# Patient Record
Sex: FEMALE | Race: WHITE | NOT HISPANIC OR LATINO | Employment: FULL TIME | URBAN - METROPOLITAN AREA
[De-identification: names, ages, dates, MRNs, and addresses within clinical notes are randomized per-mention and may not be internally consistent; named-entity substitution may affect disease eponyms.]

---

## 2017-02-01 ENCOUNTER — OFFICE VISIT (OUTPATIENT)
Dept: ORTHOPEDIC SURGERY | Facility: CLINIC | Age: 37
End: 2017-02-01

## 2017-02-01 VITALS — TEMPERATURE: 98.2 F | WEIGHT: 150 LBS | BODY MASS INDEX: 24.11 KG/M2 | HEIGHT: 66 IN

## 2017-02-01 DIAGNOSIS — M25.511 CHRONIC RIGHT SHOULDER PAIN: Primary | ICD-10-CM

## 2017-02-01 DIAGNOSIS — IMO0002 BURSITIS/TENDONITIS, SHOULDER: ICD-10-CM

## 2017-02-01 DIAGNOSIS — M77.11 RIGHT TENNIS ELBOW: ICD-10-CM

## 2017-02-01 DIAGNOSIS — G89.29 CHRONIC RIGHT SHOULDER PAIN: Primary | ICD-10-CM

## 2017-02-01 PROCEDURE — 99204 OFFICE O/P NEW MOD 45 MIN: CPT | Performed by: ORTHOPAEDIC SURGERY

## 2017-02-01 PROCEDURE — 73030 X-RAY EXAM OF SHOULDER: CPT | Performed by: ORTHOPAEDIC SURGERY

## 2017-02-01 PROCEDURE — 73010 X-RAY EXAM OF SHOULDER BLADE: CPT | Performed by: ORTHOPAEDIC SURGERY

## 2017-02-01 PROCEDURE — 20610 DRAIN/INJ JOINT/BURSA W/O US: CPT | Performed by: ORTHOPAEDIC SURGERY

## 2017-02-01 PROCEDURE — 20605 DRAIN/INJ JOINT/BURSA W/O US: CPT | Performed by: ORTHOPAEDIC SURGERY

## 2017-02-01 RX ADMIN — BUPIVACAINE HYDROCHLORIDE 1 ML: 5 INJECTION, SOLUTION PERINEURAL at 17:10

## 2017-02-01 RX ADMIN — METHYLPREDNISOLONE ACETATE 160 MG: 80 INJECTION, SUSPENSION INTRA-ARTICULAR; INTRALESIONAL; INTRAMUSCULAR; SOFT TISSUE at 17:09

## 2017-02-01 RX ADMIN — LIDOCAINE HYDROCHLORIDE 2 ML: 10 INJECTION, SOLUTION INFILTRATION; PERINEURAL at 17:09

## 2017-02-01 RX ADMIN — METHYLPREDNISOLONE ACETATE 80 MG: 80 INJECTION, SUSPENSION INTRA-ARTICULAR; INTRALESIONAL; INTRAMUSCULAR; SOFT TISSUE at 17:10

## 2017-02-01 RX ADMIN — BUPIVACAINE HYDROCHLORIDE 2 ML: 5 INJECTION, SOLUTION PERINEURAL at 17:09

## 2017-02-01 RX ADMIN — LIDOCAINE HYDROCHLORIDE 1 ML: 10 INJECTION, SOLUTION INFILTRATION; PERINEURAL at 17:10

## 2017-02-04 RX ORDER — LIDOCAINE HYDROCHLORIDE 10 MG/ML
1 INJECTION, SOLUTION INFILTRATION; PERINEURAL
Status: COMPLETED | OUTPATIENT
Start: 2017-02-01 | End: 2017-02-01

## 2017-02-04 RX ORDER — METHYLPREDNISOLONE ACETATE 80 MG/ML
80 INJECTION, SUSPENSION INTRA-ARTICULAR; INTRALESIONAL; INTRAMUSCULAR; SOFT TISSUE
Status: COMPLETED | OUTPATIENT
Start: 2017-02-01 | End: 2017-02-01

## 2017-02-04 RX ORDER — BUPIVACAINE HYDROCHLORIDE 5 MG/ML
2 INJECTION, SOLUTION PERINEURAL
Status: COMPLETED | OUTPATIENT
Start: 2017-02-01 | End: 2017-02-01

## 2017-02-04 RX ORDER — LIDOCAINE HYDROCHLORIDE 10 MG/ML
2 INJECTION, SOLUTION INFILTRATION; PERINEURAL
Status: COMPLETED | OUTPATIENT
Start: 2017-02-01 | End: 2017-02-01

## 2017-02-04 RX ORDER — METHYLPREDNISOLONE ACETATE 80 MG/ML
160 INJECTION, SUSPENSION INTRA-ARTICULAR; INTRALESIONAL; INTRAMUSCULAR; SOFT TISSUE
Status: COMPLETED | OUTPATIENT
Start: 2017-02-01 | End: 2017-02-01

## 2017-02-04 RX ORDER — BUPIVACAINE HYDROCHLORIDE 5 MG/ML
1 INJECTION, SOLUTION PERINEURAL
Status: COMPLETED | OUTPATIENT
Start: 2017-02-01 | End: 2017-02-01

## 2017-02-04 NOTE — PROGRESS NOTES
Patient: Lexus Johnson    YOB: 1980    Medical Record Number: [id number]    Chief Complaints:   Right shoulder and elbow pain    History of Present Illness:     36 y.o. female patient who presents with right shoulder pain and weakness.  She cannot recall any specific inciting event or factor.  Symptoms started about 9 months ago.  At the time, they were renovating their house and she feels that it may have been related to overuse.  She rested the shoulder and took ibuprofen which seemed to help significantly.  Unfortunately, she continues to have moderate intermittent aching and/or burning pain in the shoulder.  The pain is typically worse with reaching, lifting, and she specifically mentions that certain exercises and painting tend to aggravate her pain.  Localizes her pain primarily to the lateral aspect of the shoulder.    She has also been experiencing lateral sided elbow pain for the past several months.  She works as a  and she notices that certain movements at work tend to aggravate her pain.  Again, she cannot recall any specific inciting event or factor.    Allergies: No Known Allergies    Home Medications:    Current Outpatient Prescriptions:   •  naproxen (NAPROSYN) 375 MG tablet, Take 1 tablet by mouth 2 (two) times a day with meals., Disp: 60 tablet, Rfl: 3    Past Medical History   Diagnosis Date   • Allergic    • Scoliosis        Past Surgical History   Procedure Laterality Date   • Appendectomy     • [place] tooth extraction         Social History     Occupational History   •       Social History Main Topics   • Smoking status: Never Smoker   • Smokeless tobacco: Never Used   • Alcohol use No   • Drug use: No   • Sexual activity: Yes     Partners: Male      Social History     Social History Narrative       Family History   Problem Relation Age of Onset   • Depression Mother    • Scoliosis Mother      mother side of family   • Diabetes Maternal  "Grandmother    • Stroke Maternal Grandmother    • Arthritis Paternal Grandmother    • Alcohol abuse Paternal Grandfather    • Cancer Maternal Grandfather      brain       Review of Systems:      Constitutional: Denies fever, shaking or chills   Eyes: Denies change in visual acuity   HEENT: Denies nasal congestion or sore throat   Respiratory: Denies cough or shortness of breath   Cardiovascular: Denies chest pain or edema  Endocrine: Denies tremors, palpitations, intolerance of heat or cold, polyuria, polydipsia.  GI: Denies abdominal pain, nausea, vomiting, bloody stools or diarrhea  : Denies frequency, urgency, incontinence, retention, or nocturia.  Musculoskeletal: Denies numbness tingling or loss of motor function except as above  Integument: Denies rash, lesion or ulceration   Neurologic: Denies headache or focal weakness, deficits  Heme: Denies epistaxis, spontaneous or excessive bleeding, epistaxis, hematuria, melena, fatigue, enlarged or tender lymph nodes.      All other pertinent positives and negatives as noted above in HPI.    Physical Exam: 36 y.o. female  Vitals:    02/01/17 0917   Temp: 98.2 °F (36.8 °C)   Weight: 150 lb (68 kg)   Height: 66\" (167.6 cm)       General:  Patient is awake and alert.  Appears in no acute distress or discomfort.    Psych:  Affect and demeanor are appropriate.    Eyes:  Conjunctiva and sclera appear grossly normal.  Eyes track well and EOM seem to be intact.    Ears:  No gross abnormalities.  Hearing adequate for the exam.    Cardiovascular:  Regular rate and rhythm.    Lungs:  Good chest expansion.  Breathing unlabored.    Lymph:  No palpable adenopathy about neck or axilla.    Neck:  Supple.  Normal ROM.  Negative Spurling's for shoulder or arm pain.    Right upper extremity:  Skin benign and intact without evidence for swelling, masses or atrophy.  No palpable masses.  No focal areas of exquisite tenderness.  Full active ROM.  No evident instability or apprehension.  " Positive Neer and [place] impingement maneuvers.  Negative Speeds, Yergason's and active compression maneuvers.  She has pain with resistive testing of elevation in scapular plane but no weakness.  Negative Hornblower's and ER lag sign.  She has focal tenderness over the lateral aspect of the elbow, centered over the lateral epicondyle.  No tenderness over the radial tunnel.  Full elbow motion.  The pain over the lateral side of her elbow is worse with resisted wrist extension.  Good strength in wrist and hand.  Intact sensation in arm, hand.  Palpable radial pulse with brisk cap refill.         Radiology:   AP, scapular Y, and axillary views of the right shoulder are ordered by myself and reviewed to evaluate the patient's complaint.  No comparison films are immediately available.  The x-rays show no obvious acute abnormalities, lesions, masses, significant degenerative changes, or other concerning findings.  The acromiohumeral interval is normal.  Glenoid version appears normal as well.  Her previous MRI of the right shoulder from December is also reviewed along with the associated report.  She appears to have moderate subacromial/subdeltoid bursitis.  She also has what appears to be a low-grade articular sided supraspinatus tendon tear.    Assessment/Plan:   1.  Right shoulder subacromial/subdeltoid bursitis with low grade partial thickness rotator cuff tear  2.  Right elbow lateral epicondylitis    We had a lengthy discussion about her options.  I told her that if this were a high-grade tear or full-thickness tear that I would certainly recommend surgery for her given her young age.  On the other hand, she has what appears to be a low-grade articular sided tear and I certainly would not recommend surgery for this at this time.  My clinical impression is that the majority of her symptoms are related to the bursitis and I think that there is a good chance we can get those to subside with an injection.  I think  that through a combination of the injection and physical therapy that there is an excellent chance that we can get her better without surgery.  If not, we can always consider surgery down the road.  She was agreeable to trying an injection for the shoulder.  The risks, benefits, and alternatives were discussed and she consented to proceed.  I've also given her a referral to formal physical therapy.    I also recommended an injection for the tennis elbow.  Once again, she consented to proceed with that as well.  I will have the therapist evaluate her elbow at the same time.  Going forward, Lexus can follow-up with me as needed.  If either her shoulder or elbow pain persists, I told her that I will be happy to see her back at any point.    Large Joint Arthrocentesis  Date/Time: 2/1/2017 5:09 PM  Consent given by: patient  Site marked: site marked  Timeout: Immediately prior to procedure a time out was called to verify the correct patient, procedure, equipment, support staff and site/side marked as required   Supporting Documentation  Indications: pain and joint swelling   Procedure Details  Location: shoulder - R subacromial bursa  Preparation: Patient was prepped and draped in the usual sterile fashion  Needle size: 25 G  Approach: posterior  Medications administered: 2 mL lidocaine 1 %; 160 mg methylPREDNISolone acetate 80 MG/ML; 2 mL bupivacaine  Patient tolerance: patient tolerated the procedure well with no immediate complications    Medium Joint Arthrocentesis  Date/Time: 2/1/2017 5:10 PM  Consent given by: patient  Site marked: site marked  Timeout: Immediately prior to procedure a time out was called to verify the correct patient, procedure, equipment, support staff and site/side marked as required   Supporting Documentation  Indications: pain   Procedure Details  Location: elbow - R elbow (Point of maximal tenderness over lateral epicondyles)  Preparation: Patient was prepped and draped in the usual sterile  fashion  Needle size: 25 G  Approach: anterolateral  Medications administered: 1 mL lidocaine 1 %; 80 mg methylPREDNISolone acetate 80 MG/ML; 1 mL bupivacaine  Patient tolerance: patient tolerated the procedure well with no immediate complications        Donald Gamez MD    02/01/2017    CC to Demian Rm Jr., MD